# Patient Record
Sex: MALE | Race: WHITE | NOT HISPANIC OR LATINO | URBAN - METROPOLITAN AREA
[De-identification: names, ages, dates, MRNs, and addresses within clinical notes are randomized per-mention and may not be internally consistent; named-entity substitution may affect disease eponyms.]

---

## 2021-06-04 ENCOUNTER — EMERGENCY (EMERGENCY)
Facility: HOSPITAL | Age: 66
LOS: 1 days | Discharge: HOME | End: 2021-06-04
Attending: EMERGENCY MEDICINE
Payer: COMMERCIAL

## 2021-06-04 VITALS
HEART RATE: 63 BPM | RESPIRATION RATE: 18 BRPM | DIASTOLIC BLOOD PRESSURE: 92 MMHG | TEMPERATURE: 98 F | OXYGEN SATURATION: 96 % | SYSTOLIC BLOOD PRESSURE: 162 MMHG | WEIGHT: 210.1 LBS

## 2021-06-04 PROCEDURE — 12002 RPR S/N/AX/GEN/TRNK2.6-7.5CM: CPT

## 2021-06-04 PROCEDURE — 99284 EMERGENCY DEPT VISIT MOD MDM: CPT | Mod: 25

## 2021-06-04 PROCEDURE — 99283 EMERGENCY DEPT VISIT LOW MDM: CPT | Mod: 25

## 2021-06-04 RX ORDER — TETANUS TOXOID, REDUCED DIPHTHERIA TOXOID AND ACELLULAR PERTUSSIS VACCINE, ADSORBED 5; 2.5; 8; 8; 2.5 [IU]/.5ML; [IU]/.5ML; UG/.5ML; UG/.5ML; UG/.5ML
0.5 SUSPENSION INTRAMUSCULAR ONCE
Refills: 0 | Status: COMPLETED | OUTPATIENT
Start: 2021-06-04 | End: 2021-06-04
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
## 2021-06-04 NOTE — ED PROVIDER NOTE - PATIENT PORTAL LINK FT
You can access the FollowMyHealth Patient Portal offered by [facility] by registering at the following website: [email or web address]. By joining [employer]’s FollowMyHealth portal, you will also be able to view your health information using other applications (apps) compatible with our system.
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
## 2021-06-04 NOTE — ED PROVIDER NOTE - MUSCULOSKELETAL, MLM
Spine appears normal, range of motion is not limited, no muscle or joint tenderness, no chest wall or flank TTP, FROM, UE and LE
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
## 2021-06-12 ENCOUNTER — EMERGENCY (EMERGENCY)
Facility: HOSPITAL | Age: 66
LOS: 1 days | Discharge: HOME | End: 2021-06-12
Attending: EMERGENCY MEDICINE
Payer: COMMERCIAL

## 2021-06-12 VITALS
SYSTOLIC BLOOD PRESSURE: 155 MMHG | HEART RATE: 81 BPM | DIASTOLIC BLOOD PRESSURE: 91 MMHG | TEMPERATURE: 98 F | OXYGEN SATURATION: 98 % | HEIGHT: 70 IN | RESPIRATION RATE: 20 BRPM | WEIGHT: 229.94 LBS

## 2021-06-12 PROBLEM — Z78.9 OTHER SPECIFIED HEALTH STATUS: Chronic | Status: ACTIVE | Noted: 2021-06-04

## 2021-06-12 PROCEDURE — L9995: CPT

## 2021-06-12 PROCEDURE — G0463: CPT

## 2021-06-12 NOTE — ED PROVIDER NOTE - PHYSICAL EXAMINATION
Vital signs noted, see flowsheet.  General: Well nourished/developed. In no acute distress, well appearing and non-toxic.  HEENT: Moist mucous membranes.   Cardiac: Peripheral pulses 2+ and symmetric b/l.  Respiratory: No distress   Skin: 4 cm well healed laceration to frontal scalp with staples in place, no wound dehiscence, no surrounding erythema/red streaking, no discharge, no increased warmth.   Neuro: Awake, alert and oriented to person/place/time/situation. Moves all extremities spontaneously and symmetrically.

## 2021-06-12 NOTE — ED PROVIDER NOTE - PATIENT PORTAL LINK FT
You can access the FollowMyHealth Patient Portal offered by [facility] by registering at the following website: [email or web address]. By joining [employer]’s FollowMyHealth portal, you will also be able to view your health information using other applications (apps) compatible with our system.

## 2021-06-12 NOTE — ED PROVIDER NOTE - NSFOLLOWUPINSTRUCTIONS_ED_ALL_ED_FT
- Please follow up with your Primary Care Doctor in 1 - 2 days. If you cannot follow-up with your primary care doctor please return to the Emergency Department for any urgent issues.  - Seek immediate medical care for any new, worsening or concerning signs or symptoms.   - Your blood pressure reading was high while in ED, please monitor your blood pressure at home and follow up with PMD.     - If you have difficulty following up, please call: [phone] or go to [email or web address] to obtain a [facility] doctor or specialist who takes your insurance in your area.    Feel better!     Staple Care    WHAT YOU NEED TO KNOW:    Staples are often used to close a wound. Your staples may be placed for 3 to 14 days, depending on the location of your wound.    DISCHARGE INSTRUCTIONS:    Care for your wound:   •Clean: ?You may be able to shower in 24 hours. Do not soak your wound under water.      ?Gently wash your wound with soap and warm water daily. Lightly pat it dry. Do not cover your wound unless your healthcare provider tells you to.       ?You may also need to clean your wound with a mixture of hydrogen peroxide and water. Ask how to do this.      ?Do not apply ointment or cream to the wound unless your healthcare provider tells you to.      •Elevate: ?Rest any arm or leg that has a wound on pillows above the level of your heart. Do this as often as possible for 2 days. This will help decrease swelling and pain, and help you heal faster.          •Minimize scarring: ?Avoid sunshine on your wound to reduce scarring.             Follow up with your healthcare provider as directed: You may need to return for a wound checkup 3 days after your staples are placed. Ask when you should return to get your staples removed.    Staple removal:   •A medical staple remover will be used to take out your staples. Your healthcare provider will slide the tool under each staple, squeeze the handle, and gently pull the staple out.       •Medical tape will be placed on your wound once your staples are removed. This will help keep your wound closed. The medical tape will fall off on its own after several days.       Contact your healthcare provider if:   •You have redness, pain, swelling, and pus draining from your wound.      •Your pain medicine does not relieve your pain.      •You have a fever of 101°F (38.5°C) or higher.      •You have an odor coming from your wound.      •You have questions or concerns about your condition or care.      Return to the emergency department if:   •Your wound reopens.      •You have red streaks in your skin that spread out from your wound.      •You have severe pain or vomiting.

## 2021-06-12 NOTE — ED PROVIDER NOTE - OBJECTIVE STATEMENT
64 y/o M presents to ED for staple removal to scalp of staples placed 1 week ago. Pt has been cleaning wound as instructed at home. Pt has no other acute complaints at this time.  Denies fever/chills, erythema, warmth at site, discharge or pus from wound, headache, dizziness, numbness/tingling, weakness, CP, SOB. Pt reports TDAP up to date.

## 2021-06-12 NOTE — ED PROVIDER NOTE - CLINICAL SUMMARY MEDICAL DECISION MAKING FREE TEXT BOX
64 y/o M presents to ED for staple removal to scalp of staples placed 1 week ago. Pt has been cleaning wound as instructed at home. Pt has no other acute complaints at this time. Wound well healed, cleaned, staples removed, wound care provided, f/u PMD  Pt informed of elevated blood pressure reading while in ED, advised to monitor BP and follow up with PMD.   -Discussed results, plan and return precautions with patient, pt verbalized understanding and agreement of plan